# Patient Record
Sex: MALE | Race: ASIAN | NOT HISPANIC OR LATINO | Employment: FULL TIME | URBAN - METROPOLITAN AREA
[De-identification: names, ages, dates, MRNs, and addresses within clinical notes are randomized per-mention and may not be internally consistent; named-entity substitution may affect disease eponyms.]

---

## 2019-05-03 ENCOUNTER — OFFICE VISIT (OUTPATIENT)
Dept: URGENT CARE | Facility: PHYSICIAN GROUP | Age: 24
End: 2019-05-03
Payer: COMMERCIAL

## 2019-05-03 VITALS
BODY MASS INDEX: 28.09 KG/M2 | DIASTOLIC BLOOD PRESSURE: 82 MMHG | SYSTOLIC BLOOD PRESSURE: 128 MMHG | OXYGEN SATURATION: 99 % | HEIGHT: 67 IN | TEMPERATURE: 98.6 F | RESPIRATION RATE: 16 BRPM | WEIGHT: 179 LBS | HEART RATE: 71 BPM

## 2019-05-03 DIAGNOSIS — J40 BRONCHITIS: Primary | ICD-10-CM

## 2019-05-03 PROCEDURE — 99204 OFFICE O/P NEW MOD 45 MIN: CPT | Performed by: NURSE PRACTITIONER

## 2019-05-03 RX ORDER — AZITHROMYCIN 250 MG/1
TABLET, FILM COATED ORAL
Qty: 6 TAB | Refills: 0 | Status: SHIPPED | OUTPATIENT
Start: 2019-05-03 | End: 2019-05-08

## 2019-05-03 RX ORDER — ALBUTEROL SULFATE 90 UG/1
2 AEROSOL, METERED RESPIRATORY (INHALATION) EVERY 4 HOURS PRN
Qty: 8.5 G | Refills: 0 | Status: SHIPPED | OUTPATIENT
Start: 2019-05-03

## 2019-05-03 ASSESSMENT — ENCOUNTER SYMPTOMS
CHILLS: 1
COUGH: 1
SPUTUM PRODUCTION: 1
SORE THROAT: 1
SHORTNESS OF BREATH: 1
RHINORRHEA: 1

## 2019-05-03 NOTE — PROGRESS NOTES
Subjective:     Mitch Cerda is a 23 y.o. male who presents for Cough (pt stated cough is making his throat sore, runny nose, chest congestionl x 2 weeks)       Cough   The cough is productive of sputum. Associated symptoms include chills, nasal congestion, postnasal drip, rhinorrhea, a sore throat and shortness of breath. His past medical history is significant for asthma (childhood).   This is a new problem. Patient presents with symptoms worsening over the past 2 weeks. Reports a productive cough, shortness of breath, runny nose, nasal congestion, chills, and sore throat. He has tried OTC DayQuil which has help with the cough.    PMH:  has no past medical history of Diabetes (HCC); Hyperlipidemia; or Hypertension.    MEDS:   Current Outpatient Prescriptions:   •  azithromycin (ZITHROMAX) 250 MG Tab, Take 2 tabs by mouth once today, then one tab by mouth once daily days 2-5., Disp: 6 Tab, Rfl: 0  •  albuterol 108 (90 Base) MCG/ACT Aero Soln inhalation aerosol, Inhale 2 Puffs by mouth every four hours as needed for Shortness of Breath., Disp: 8.5 g, Rfl: 0  •  benzoyl peroxide-erythromycin (BENZAMYCIN) gel, Apply thin layer to affected area twice daily, Disp: 23.3 g, Rfl: 6    ALLERGIES:   Allergies   Allergen Reactions   • Amoxicillin    • Pcn [Penicillins]      SURGHX: No past surgical history on file.    SOCHX:  reports that he has never smoked. He uses smokeless tobacco. He reports that he drinks alcohol. He reports that he does not use drugs.     FH: Reviewed with patient, not pertinent to this visit.     Review of Systems   Constitutional: Positive for chills and malaise/fatigue.   HENT: Positive for congestion, postnasal drip, rhinorrhea and sore throat.    Respiratory: Positive for cough, sputum production and shortness of breath.    All other systems reviewed and are negative.    Objective:     /82 (BP Location: Left arm, Patient Position: Sitting, BP Cuff Size: Adult)   Pulse 71   Temp 37 °C (98.6  "°F) (Temporal)   Resp 16   Ht 1.702 m (5' 7\")   Wt 81.2 kg (179 lb)   SpO2 99%   BMI 28.04 kg/m²     Physical Exam   Constitutional: He is oriented to person, place, and time. He appears well-developed and well-nourished. He is cooperative.  Non-toxic appearance. No distress.   HENT:   Head: Normocephalic and atraumatic.   Right Ear: External ear normal.   Left Ear: External ear normal.   Nose: Mucosal edema and rhinorrhea present.   Mouth/Throat: Uvula is midline and mucous membranes are normal. Posterior oropharyngeal edema and posterior oropharyngeal erythema present. No oropharyngeal exudate.   Eyes: Pupils are equal, round, and reactive to light. Conjunctivae and EOM are normal.   Neck: Normal range of motion.   Cardiovascular: Normal rate, regular rhythm, normal heart sounds and normal pulses.    Pulmonary/Chest: Effort normal. No respiratory distress. He has no decreased breath sounds. He has wheezes. He has rhonchi.   Abdominal: Soft. Bowel sounds are normal. There is no tenderness.   Musculoskeletal: Normal range of motion. He exhibits no deformity.   Lymphadenopathy:     He has no cervical adenopathy.   Neurological: He is alert and oriented to person, place, and time. He has normal strength. No sensory deficit.   Skin: Skin is warm, dry and intact. Capillary refill takes less than 2 seconds.   Psychiatric: He has a normal mood and affect. His behavior is normal.   Vitals reviewed.        Assessment/Plan:     1. Bronchitis  - azithromycin (ZITHROMAX) 250 MG Tab; Take 2 tabs by mouth once today, then one tab by mouth once daily days 2-5.  Dispense: 6 Tab; Refill: 0  - albuterol 108 (90 Base) MCG/ACT Aero Soln inhalation aerosol; Inhale 2 Puffs by mouth every four hours as needed for Shortness of Breath.  Dispense: 8.5 g; Refill: 0    Discussed supportive measures including increasing fluids and rest as well as OTC symptom management including acetaminophen and/or ibuprofen PRN pain and/or fever. " Patient states that DayQuil has been effective for the cough. Pt reports of worsening symptoms, cough, SOB, chills. Wheezing and rhonchi on physical exam. Rx sent electronically for albuterol inhaler and Z-Emiliano. Work note provided.    Patient advised to: Return for 1) Symptoms don't improve or worsen, or go to ER, 2) Follow up with primary care in 7-10 days.    Differential diagnosis, natural history, supportive care, and indications for immediate follow-up discussed. All questions answered. Patient agrees with the plan of care.    Billing note: patient billed as a new patient as the patient has not received any professional medical services from myself or another provider of the same specialty who belongs to the same group practice within the previous 3 years.

## 2019-05-03 NOTE — PATIENT INSTRUCTIONS

## 2019-05-03 NOTE — LETTER
May 3, 2019         Patient: Mitch Cerda   YOB: 1995   Date of Visit: 5/3/2019           To Whom it May Concern:    Mitch Cerda was seen in my clinic on 5/3/2019 due to an acute illness. Please excuse him from physical testing until he is feeling better and his symptoms have resolved.    If you have any questions or concerns, please don't hesitate to call.        Sincerely,           CLIFF Franco.  Electronically Signed

## 2020-02-27 ENCOUNTER — OFFICE VISIT (OUTPATIENT)
Dept: DERMATOLOGY | Facility: IMAGING CENTER | Age: 25
End: 2020-02-27
Payer: COMMERCIAL

## 2020-02-27 VITALS — WEIGHT: 190 LBS | BODY MASS INDEX: 29.82 KG/M2 | HEIGHT: 67 IN | TEMPERATURE: 97.1 F

## 2020-02-27 DIAGNOSIS — L21.9 SEBORRHEA: ICD-10-CM

## 2020-02-27 PROCEDURE — 99202 OFFICE O/P NEW SF 15 MIN: CPT | Performed by: DERMATOLOGY

## 2020-02-27 RX ORDER — DOXYCYCLINE HYCLATE 100 MG
100 TABLET ORAL 2 TIMES DAILY
Qty: 60 TAB | Refills: 3 | Status: SHIPPED | OUTPATIENT
Start: 2020-02-27

## 2020-02-27 RX ORDER — TRETINOIN 0.05 G/100G
1 GEL TOPICAL
Qty: 45 G | Refills: 1 | Status: SHIPPED | OUTPATIENT
Start: 2020-02-27

## 2020-02-27 RX ORDER — FLUOCINONIDE TOPICAL SOLUTION USP, 0.05% 0.5 MG/ML
SOLUTION TOPICAL
Qty: 1 BOTTLE | Refills: 3 | Status: SHIPPED | OUTPATIENT
Start: 2020-02-27

## 2025-02-19 NOTE — PROGRESS NOTES
CC: scalp flaking/acne    Subjective: new patient here for scalp flaking and acne    Patient to discuss dry scalp on - off x 2 years  Has tried OTC anti dandruff shampoo with no improvement     History of skin cancer: No  History of biopsies:No  History of blistering/severe sunburns:No  Family history of skin cancer:No  Family history of atypical moles:No    Tobacco use: chew tobacco   Alcohol use:denies alcohol consumption  Allergies:Yes, Details: .Amox, PCN     Acne - on face predominantly.  Rarely spots on chest/back.  Some deep.  Using OTC face washes currently.    ROS: no fevers/chills. No itch.  Denies depression. Denies abd pain/hematochezia  DermPMH: no skin cancer/melanoma  No problem-specific Assessment & Plan notes found for this encounter.    Relevant PMH:Denies IBD.  Denies depression  Social: in [employer], never smoker - does chew    PE: Gen:WDWN male in NAD. Skin: scalp/eyebrows/lashes/face/eyes/lips/neck examined. Declined chest/back exam  -few inflammatory acne papules on chin, jaw, some shallow/cystic.  Ice-pick scarring on cheeks  -mild scalp scaling, none noted on face      A/P: seb derm: mild:  -reviewed dx/tx  -lidex solution Qd-BID PRN  -alternate OTC scalp shampoos, pick 2, list provided    Acne, mild, inflammatory/shallow cystic:  -reviewed accutane, may consider for future trx  -doxycycline 100mg PO BID, se reviewed  -tretinoin 0.05% gel QHS, se reviewed  -gentle skin cleanser/BPO wash  -moisturizer - noncomedogenic  -f/u 2 months      I have reviewed medications relevant to my specialty.            
No indicators present